# Patient Record
Sex: MALE | Race: BLACK OR AFRICAN AMERICAN
[De-identification: names, ages, dates, MRNs, and addresses within clinical notes are randomized per-mention and may not be internally consistent; named-entity substitution may affect disease eponyms.]

---

## 2018-03-06 ENCOUNTER — HOSPITAL ENCOUNTER (EMERGENCY)
Dept: HOSPITAL 25 - UCEAST | Age: 48
Discharge: HOME | End: 2018-03-06
Payer: COMMERCIAL

## 2018-03-06 VITALS — SYSTOLIC BLOOD PRESSURE: 163 MMHG | DIASTOLIC BLOOD PRESSURE: 118 MMHG

## 2018-03-06 DIAGNOSIS — R11.0: ICD-10-CM

## 2018-03-06 DIAGNOSIS — I10: ICD-10-CM

## 2018-03-06 DIAGNOSIS — Z87.442: ICD-10-CM

## 2018-03-06 DIAGNOSIS — Z90.49: ICD-10-CM

## 2018-03-06 DIAGNOSIS — J06.9: Primary | ICD-10-CM

## 2018-03-06 DIAGNOSIS — Z88.1: ICD-10-CM

## 2018-03-06 PROCEDURE — G0463 HOSPITAL OUTPT CLINIC VISIT: HCPCS

## 2018-03-06 PROCEDURE — 99212 OFFICE O/P EST SF 10 MIN: CPT

## 2018-03-06 PROCEDURE — 71046 X-RAY EXAM CHEST 2 VIEWS: CPT

## 2018-03-06 NOTE — UC
Respiratory Complaint HPI





- HPI Summary


HPI Summary: 


3 DAYS OF COUGH, CONGESTION AND HOARSENESS. HAS SOME INTERMITTENT NAUSEA. ST 

INITIALLY HAS NOW RESOLVED. FEELS SOB AND WHEEZY. TEMP ABOUT 100 A COUPLE OF 

DAYS AGO. 





- History of Current Complaint


Chief Complaint: UCGeneralIllness


Stated Complaint: RESP ISSUE


Time Seen by Provider: 03/06/18 12:13


Hx Obtained From: Patient, Family/Caretaker - WIFE


Onset/Duration: Gradual Onset, Lasting Days, Still Present


Timing: Constant


Severity Initially: Moderate


Severity Currently: Moderate


Pain Intensity: 0


Pain Scale Used: 0-10 Numeric


Character: Cough: Nonproductive


Aggravating Factors: Nothing


Alleviating Factors: Nothing


Associated Signs And Symptoms: Positive: Dyspnea, Fever, Wheezing, URI, Nasal 

Congestion, Hoarseness.  Negative: Chills





- Allergies/Home Medications


Allergies/Adverse Reactions: 


 Allergies











Allergy/AdvReac Type Severity Reaction Status Date / Time


 


ceftriaxone [From Rocephin] Allergy Severe Airway Verified 03/06/18 11:58





   Obstruction  











Home Medications: 


 Home Medications





Acetaminophen [Tylenol Extra Strength] 2 tab PO BID 03/06/18 [History Confirmed 

03/06/18]











PMH/Surg Hx/FS Hx/Imm Hx


Cardiovascular History: Hypertension





- Surgical History


Surgical History: Yes


Surgery Procedure, Year, and Place: 2007 LAPAROSCOPIC CHOLECYSTECTOMY, 

[place].  4/9/2014 BLASTING OF (27 stones)KIDNEY STONE RIGHT SIDE, [facility] [place]





- Family History


Known Family History: Positive: Hypertension





- Social History


Alcohol Use: Occasionally


Substance Use Type: None


Smoking Status (MU): Never Smoked Tobacco





- Immunization History


Most Recent Tetanus Shot: unknown





Review of Systems


Constitutional: Fever, Fatigue


ENT: Sore Throat, Nasal Discharge


Respiratory: Shortness Of Breath, Cough


Cardiovascular: Negative


Gastrointestinal: Vomiting, Nausea


Musculoskeletal: Myalgia


All Other Systems Reviewed And Are Negative: Yes





Physical Exam


Triage Information Reviewed: Yes


Appearance: Well-Appearing, No Pain Distress, Well-Nourished


Vital Signs: 


 Initial Vital Signs











Temp  99.3 F   03/06/18 11:58


 


Pulse  95   03/06/18 11:58


 


Resp  21   03/06/18 11:58


 


BP  163/118   03/06/18 11:58


 


Pulse Ox  98   03/06/18 11:58











Vital Signs Reviewed: Yes


Eyes: Positive: Conjunctiva Clear


ENT: Positive: Hearing grossly normal, Pharynx normal, TMs normal


Neck: Positive: Supple, Nontender, No Lymphadenopathy


Respiratory Exam: Normal


Cardiovascular Exam: Normal


Abdomen Description: Positive: Soft


Musculoskeletal: Positive: No Edema


Neurological: Positive: Alert


Psychological: Positive: Normal Response To Family, Age Appropriate Behavior


Skin: Negative: rashes





UC Diagnostic Evaluation





- Laboratory


O2 Sat by Pulse Oximetry: 98





- Radiology


Xray Interpretation: No Acute Changes - CXR


Radiology Interpretation Completed By: Radiologist





Respiratory Course/Dx





- Course


Course Of Treatment: LOW SUSPICION FOR CARDIAC ETIOLOGY AT PRESENT. GIVEN PT 

CONCERN OFFERED TRANSFER TO ER FOR CARDIAC W/U. PT DECLINES. WILL F/U OUTPT 

WITH PCP OR CARDIOLOGY. ADVISED TO GO TO ER WITHOUT FAIL IF SX WORSEN OR CHANGE.





- Differential Dx/Diagnosis


Provider Diagnoses: ACUTE URI





Discharge





- Discharge Plan


Condition: Stable


Disposition: HOME


Prescriptions: 


Albuterol HFA INHALER* [Ventolin HFA Inhaler*] 2 puff INH Q4H PRN #1 mdi


 PRN Reason: Shortness Of Breath


Codeine Phosphate/Guaifenesin [Codeine-Guaifen  mg/5 ml] 5 - 10 ml PO Q6H 

PRN #150 ml MDD 40ML


 PRN Reason: Cough


predniSONE TAB* [Deltasone TAB*] 40 mg PO DAILY #10 tab


Patient Education Materials:  Upper Respiratory Infection (ED)


Referrals: 


Salvador Rose MD [Medical Doctor] - If Needed


Gm Hampton MD [Primary Care Provider] - If Needed


Additional Instructions: 


CHEST XRAY TODAY UNREMARKABLE. YOUR SYMPTOMS ARE LIKELY VIRALLY MEDIATED AND 

SHOULD RESOLVE ON THEIR OWN WITH TIME. REST, HYDRATE, OTC MEDS AS NEEDED. WILL 

TREAT WITH PREDNISONE AND INHALER TO HELP WITH AIRWAY INFLAMMATION AND COUGH 

MEDICINE. SEEK FOLLOW-UP IF YOU ARE NOT IMPROVING OVER THE NEXT 1-2 WEEKS.





LOW SUSPICION FOR CARDIAC ETIOLOGY AT PRESENT. FOLLOW-UP WITH YOUR PCP OR 

CARDIOLOGY IF YOU DESIRE A CARDIAC EVALUATION.





GO TO ER WITHOUT FAIL IF YOU DEVELOP WORSENING SHORTNESS OF BREATH, CHEST PAIN, 

NAUSEA, SWEATS, DIZZINESS OR ANY OTHER CONCERNING SYMPTOMS.

## 2018-03-06 NOTE — RAD
INDICATION:  Cough.



COMPARISON:  Comparison is made with a prior chest x-ray study from April 27, 2016.



TECHNIQUE: Dual-energy PA  and lateral views of the chest were obtained.



FINDINGS:   The heart is within normal limits in size. Mediastinal and hilar contours

appear within normal limits.



The lungs are underinflated. There is minimal atelectasis at the left lung base. The lungs

are otherwise clear. No pleural effusion is seen. 



IMPRESSION:  NO EVIDENCE FOR ACUTE FINDING.

## 2018-10-12 ENCOUNTER — HOSPITAL ENCOUNTER (EMERGENCY)
Dept: HOSPITAL 25 - ED | Age: 48
LOS: 1 days | Discharge: HOME | End: 2018-10-13
Payer: COMMERCIAL

## 2018-10-12 DIAGNOSIS — I10: Primary | ICD-10-CM

## 2018-10-12 DIAGNOSIS — R42: ICD-10-CM

## 2018-10-12 LAB
BASOPHILS # BLD AUTO: 0.1 10^3/UL (ref 0–0.2)
EOSINOPHIL # BLD AUTO: 0.4 10^3/UL (ref 0–0.6)
HCT VFR BLD AUTO: 51 % (ref 42–52)
HGB BLD-MCNC: 17.9 G/DL (ref 14–18)
LYMPHOCYTES # BLD AUTO: 2.4 10^3/UL (ref 1–4.8)
MCH RBC QN AUTO: 31 PG (ref 27–31)
MCHC RBC AUTO-ENTMCNC: 35 G/DL (ref 31–36)
MCV RBC AUTO: 88 FL (ref 80–94)
MONOCYTES # BLD AUTO: 0.9 10^3/UL (ref 0–0.8)
NEUTROPHILS # BLD AUTO: 3.1 10^3/UL (ref 1.5–7.7)
NRBC # BLD AUTO: 0 10^3/UL
NRBC BLD QL AUTO: 0.2
PLATELET # BLD AUTO: 299 10^3/UL (ref 150–450)
RBC # BLD AUTO: 5.81 10^6/UL (ref 4–5.4)
WBC # BLD AUTO: 6.7 10^3/UL (ref 3.5–10.8)

## 2018-10-12 PROCEDURE — 36415 COLL VENOUS BLD VENIPUNCTURE: CPT

## 2018-10-12 PROCEDURE — 99283 EMERGENCY DEPT VISIT LOW MDM: CPT

## 2018-10-12 PROCEDURE — 96374 THER/PROPH/DIAG INJ IV PUSH: CPT

## 2018-10-12 PROCEDURE — 96375 TX/PRO/DX INJ NEW DRUG ADDON: CPT

## 2018-10-12 PROCEDURE — 85025 COMPLETE CBC W/AUTO DIFF WBC: CPT

## 2018-10-12 PROCEDURE — 80053 COMPREHEN METABOLIC PANEL: CPT

## 2018-10-12 NOTE — ED
Hypertension





- HPI Summary


HPI Summary: 


Pt is a 47 y/o male who presents to the ED c/o HTN. He states he was started on 

Valsartan again 4 days ago, 80 mg QD. Pt was on Valsartan years ago but stopped 

because he was doing well. He checks his BP twice a day. Pt initially went to 

his PCP 4 days ago because he was feeling lightheaded. He denies any nausea, 

visual changes, headache, or tinnitus. As per wife, he has been under extra 

stress lately. BP is 192/131 while in room. He takes daily ASA.








- History of Current Complaint


Chief Complaint: EDHypertension


Stated Complaint: HIGH BP


Time Seen by Provider: 10/12/18 22:00


Hx Obtained From: Patient, Family/Caretaker - Wife


Onset/Duration: Still Present


Timing: Constant


Alleviating Factor(s): Nothing


Associated Signs & Symptoms: Anxiety/Stress, Other: - Lightheadedness


Related Hx: Diagnosed As: - HTN





- Allergies/Home Medications


Allergies/Adverse Reactions: 


 Allergies











Allergy/AdvReac Type Severity Reaction Status Date / Time


 


ceftriaxone [From Rocephin] Allergy Severe Airway Verified 03/06/18 11:58





   Obstruction  











Home Medications: 


 Home Medications





Valsartan 80 mg PO DAILY 10/12/18 [History Confirmed 10/12/18]











PMH/Surg Hx/FS Hx/Imm Hx


Cardiovascular History: Reports: Hx Hypertension - HX OF - NO MEDS PRESENTLY


 History: Reports: Hx Kidney Stones - Hx BI-LATERAL STONES,USUALLY PASS


Sensory History: 


   Denies: Hx Contacts or Glasses, Hx Hearing Aid


Opthamlomology History: 


   Denies: Hx Contacts or Glasses





- Surgical History


Surgery Procedure, Year, and Place: 2007 LAPAROSCOPIC CHOLECYSTECTOMY, 

[place].  4/9/2014 BLASTING OF (27 stones)KIDNEY STONE RIGHT SIDE, [facility]


Hx Anesthesia Reactions: No


Infectious Disease History: No


Infectious Disease History: 


   Denies: Hx Clostridium Difficile, Hx Hepatitis, Hx Human Immunodeficiency 

Virus (HIV), Hx of Known/Suspected MRSA, Hx Shingles, Hx Tuberculosis, Hx Known/

Suspected VRE, Hx Known/Suspected VRSA, History Other Infectious Disease, 

Traveled Outside the [place] in Last 30 Days





- Family History


Known Family History: Positive: Hypertension





- Social History


Alcohol Use: Occasionally


Hx Substance Use: No


Substance Use Type: Reports: None


Hx Tobacco Use: No


Smoking Status (MU): Never Smoked Tobacco





Review of Systems


ENT: Other - NEGATIVE: visual changes, tinnitus


Negative: Nausea


Neurological: Other - Lightheadedness


Negative: Headache


All Other Systems Reviewed And Are Negative: Yes





Physical Exam





- Summary


Physical Exam Summary: 


VITAL SIGNS: Reviewed.


GENERAL: Patient is a well-developed and nourished MALE who is lying 

comfortable in the stretcher. Patient is not in any acute respiratory distress.


HEAD AND FACE: No signs of trauma. No ecchymosis, hematomas or skull 

depressions. No sinus tenderness.


EYES: PERRLA, EOMI x 2, No injected conjunctiva, no nystagmus.


EARS: Hearing grossly intact. Ear canals and tympanic membranes are within 

normal limits.


MOUTH: Oropharynx within normal limits.


NECK: Supple, trachea is midline, no adenopathy, no JVD, no carotid bruit, no c-

spine tenderness, neck with full ROM.


CHEST: Symmetric, no tenderness at palpation


LUNGS: Clear to auscultation bilaterally. No wheezing or crackles.


CVS: Regular rate and rhythm, S1 and S2 present, no murmurs or gallops 

appreciated.


ABDOMEN: Soft, non-tender. No signs of distention. No rebound no guarding, and 

no masses palpated. Bowel sounds are normal.


EXTREMITIES: FROM in all major joints, no edema, no cyanosis or clubbing.


NEURO: Alert and oriented x 3. No acute neurological deficits. Speech is normal 

and follows commands.


SKIN: Dry and warm





Triage Information Reviewed: Yes


Vital Signs On Initial Exam: 


 Initial Vitals











Temp Pulse Resp BP Pulse Ox


 


 97.9 F   93   20   188/116   98 


 


 10/12/18 21:06  10/12/18 21:06  10/12/18 21:06  10/12/18 21:06  10/12/18 21:06











Vital Signs Reviewed: Yes





Diagnostics





- Vital Signs


 Vital Signs











  Temp Pulse Resp BP Pulse Ox


 


 10/12/18 21:06  97.9 F  93  20  188/116  98














- Laboratory


Result Diagrams: 


 10/12/18 22:30





 10/12/18 22:30


Lab Statement: Any lab studies that have been ordered have been reviewed, and 

results considered in the medical decision making process.





Re-Evaluation





- Re-Evaluation


  ** First Eval


Re-Evaluation Time: 23:58


Change: Improved


Comment: Feeling better, BP lower however still elevated. Pt is asymptomatic.





Hypertension Course/Dx





- Course


Course Of Treatment: Pt is a 47 y/o male who presents to the ED c/o HTN. He 

states he was started on Valsartan again 4 days ago, 80 mg QD. Pt was on 

Valsartan years ago but stopped because he was doing well. He checks his BP 

twice a day. Pt initially went to his PCP 4 days ago because he was feeling 

lightheaded. He denies any nausea, visual changes, headache, or tinnitus. As 

per wife, he has been under extra stress lately. BP is 192/131 while in room. 

He takes daily ASA. A physical exam was normal. Final dx is hypertension. Pt is 

advised to take his BP medication BID instead of QD. He is discharged and is to 

F/U with his PCP on 10/15/18.





- Diagnoses


Provider Diagnoses: 


 Hypertension








Discharge





- Sign-Out/Discharge


Documenting (check all that apply): Patient Departure





- Discharge Plan


Condition: Stable


Disposition: HOME


Patient Education Materials:  Hypertension (ED)


Referrals: 


[facility] [place] PHYSICIAN REFERRAL [Outside] - 3 Days


Additional Instructions: 


RETURN TO THE EMERGENCY DEPARTMENT FOR CHANGING OR WORSENING SYMPTOMS. FOLLOW 

UP WITH PCP IN 1-2 DAYS.





- Billing Disposition and Condition


Condition: STABLE


Disposition: Home





- Attestation Statements


Document Initiated by Scribe: Yes


Documenting Scribe: Darline Stone


Provider For Whom Tristen is Documenting (Include Credential): Abigail Reyes MD


Scribe Attestation: 


Darline FELDER scribed for Abigail Reyes MD on 10/13/18 at 0436. 


Scribe Documentation Reviewed: Yes


Provider Attestation: 


The documentation as recorded by the Darline banks accurately reflects the 

service I personally performed and the decisions made by me, Abigail Reyes MD

## 2018-10-13 VITALS — DIASTOLIC BLOOD PRESSURE: 110 MMHG | SYSTOLIC BLOOD PRESSURE: 169 MMHG
